# Patient Record
Sex: FEMALE | Race: ASIAN | NOT HISPANIC OR LATINO | Employment: UNEMPLOYED | URBAN - METROPOLITAN AREA
[De-identification: names, ages, dates, MRNs, and addresses within clinical notes are randomized per-mention and may not be internally consistent; named-entity substitution may affect disease eponyms.]

---

## 2024-01-22 ENCOUNTER — HOSPITAL ENCOUNTER (EMERGENCY)
Facility: OTHER | Age: 29
Discharge: HOME | End: 2024-01-22
Attending: EMERGENCY MEDICINE
Payer: COMMERCIAL

## 2024-01-22 VITALS
TEMPERATURE: 98 F | DIASTOLIC BLOOD PRESSURE: 61 MMHG | BODY MASS INDEX: 25.76 KG/M2 | HEIGHT: 62 IN | HEART RATE: 77 BPM | SYSTOLIC BLOOD PRESSURE: 106 MMHG | WEIGHT: 140 LBS | RESPIRATION RATE: 18 BRPM | OXYGEN SATURATION: 98 %
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
## 2024-01-22 LAB
B-HCG UR QL: NEGATIVE
CTP QC/QA: YES

## 2024-01-22 PROCEDURE — 25000003 PHARM REV CODE 250: Performed by: NURSE PRACTITIONER

## 2024-01-22 PROCEDURE — 99284 EMERGENCY DEPT VISIT MOD MDM: CPT

## 2024-01-22 PROCEDURE — 90715 TDAP VACCINE 7 YRS/> IM: CPT | Performed by: NURSE PRACTITIONER

## 2024-01-22 PROCEDURE — 63600175 PHARM REV CODE 636 W HCPCS: Performed by: NURSE PRACTITIONER

## 2024-01-22 PROCEDURE — 90471 IMMUNIZATION ADMIN: CPT | Performed by: NURSE PRACTITIONER

## 2024-01-22 PROCEDURE — 81025 URINE PREGNANCY TEST: CPT | Performed by: NURSE PRACTITIONER

## 2024-01-22 RX ORDER — AMOXICILLIN AND CLAVULANATE POTASSIUM 875; 125 MG/1; MG/1
1 TABLET, FILM COATED ORAL 2 TIMES DAILY
Qty: 14 TABLET | Refills: 0 | Status: SHIPPED | OUTPATIENT
Start: 2024-01-22

## 2024-01-22 RX ORDER — AMOXICILLIN AND CLAVULANATE POTASSIUM 875; 125 MG/1; MG/1
1 TABLET, FILM COATED ORAL
Status: COMPLETED | OUTPATIENT
Start: 2024-01-22 | End: 2024-01-22

## 2024-01-22 RX ADMIN — TETANUS TOXOID, REDUCED DIPHTHERIA TOXOID AND ACELLULAR PERTUSSIS VACCINE, ADSORBED 0.5 ML: 5; 2.5; 8; 8; 2.5 SUSPENSION INTRAMUSCULAR at 11:01

## 2024-01-22 RX ADMIN — BACITRACIN ZINC, NEOMYCIN, POLYMYXIN B 1 EACH: 400; 3.5; 5 OINTMENT TOPICAL at 11:01

## 2024-01-22 RX ADMIN — AMOXICILLIN AND CLAVULANATE POTASSIUM 1 TABLET: 875; 125 TABLET, FILM COATED ORAL at 11:01

## 2024-01-22 NOTE — ED PROVIDER NOTES
"Source of History:  Patient     Chief complaint:  Cat Scratch (Pt was scratched by a street cat on anterior aspect of R lower leg. Scratch approx 5 cm, no bleeding noted. Ambulatory at triage. She approached the cat, no other odd behavior noted about cat. Pt was not bitten by cat as far as she knows. Last tdap unknown.)      HPI:  Pancho Cheney is a 28 y.o. female presenting with complaint of cat scratches to her right lower leg that occurred prior to arrival.  Patient reports that this is a stray cat in her neighborhood that she interacts with often.  Reports today she was interacting with the cat and another cat approached and caused the other CT to scratching on the right leg.  Patient does not believe that she was bitten.  Patient's tetanus is not up-to-date    This is the extent to the patients complaints today here in the emergency department.    PMH:  As per HPI and below:  No past medical history on file.  No past surgical history on file.         Review of patient's allergies indicates:  No Known Allergies    ROS: As per HPI and below:  Constitutional: No fever.  No chills.  Eyes: No visual changes.   ENT: No sore throat. No ear pain.  Urinary: No abnormal urination.  MSK: No back pain. No joint pain.   Integument:  Cat scratches    Physical Exam:    /61 (BP Location: Right arm, Patient Position: Sitting)   Pulse 77   Temp 98 °F (36.7 °C) (Oral)   Resp 18   Ht 5' 2" (1.575 m)   Wt 63.5 kg (140 lb)   SpO2 98%   BMI 25.61 kg/m²   Vitals:    01/22/24 1017 01/22/24 1137   BP: (!) 100/55 106/61   Pulse: 80 77   Resp: 18 18   Temp: 97.9 °F (36.6 °C) 98 °F (36.7 °C)   TempSrc: Oral Oral   SpO2: 96% 98%   Weight: 63.5 kg (140 lb)    Height: 5' 2" (1.575 m)        Nursing note and vital signs reviewed.  Constitutional: No acute distress.  Eyes: No conjunctival injection.  Extraocular muscles are intact.  ENT: Normal phonation.  Musculoskeletal: Good range of motion all joints.  No deformities.  Neck " supple.  No meningismus. Neurovascularly intact.  Skin:  Multiple scratches are noted to the right lower leg no bleeding noted.  Psych: Appropriate, conversant.      Labs Reviewed   POCT URINE PREGNANCY       No orders to display         MDM/ Differential Dx:  Abrasion, cellulitis    Medical Decision Making  28-year-old female attacked by a stray cat today he reports his CT is no neighborhood and she interacts with a often.  Patient has sustained multiple scratches to the right lower leg.  Her tetanus was updated ED. patient does not believe that she was bitten however will discharge patient home with amoxicillin prophylactically.  Discussed good wound care for the scratches    Risk  Prescription drug management.             Diagnostic Impression:    1. Cat scratch         ED Disposition Condition    Discharge Stable            ED Prescriptions       Medication Sig Dispense Start Date End Date Auth. Provider    amoxicillin-clavulanate 875-125mg (AUGMENTIN) 875-125 mg per tablet Take 1 tablet by mouth 2 (two) times daily. 14 tablet 1/22/2024 -- Crissy Desai, BRENNON          Follow-up Information       Follow up With Specialties Details Why Contact Info    [facility] - Emergency Dept Emergency Medicine Go to  If symptoms worsen [address]  [facility] [id number] [phone]             Crissy Desai, BRENNON  01/22/24 0585

## 2024-01-22 NOTE — ED TRIAGE NOTES
Scratched by stray cat on right knee this morning. States cleaned with running water and alcohol PTA. Presents in no distress.

## 2024-01-22 NOTE — FIRST PROVIDER EVALUATION
Emergency Department TeleTriage Encounter Note      CHIEF COMPLAINT    Chief Complaint   Patient presents with    Cat Scratch     Pt was scratched by a street cat on anterior aspect of R lower leg. Scratch approx 5 cm, no bleeding noted. Ambulatory at triage. She approached the cat, no other odd behavior noted about cat. Pt was not bitten by cat as far as she knows. Last tdap unknown.       VITAL SIGNS   Initial Vitals [01/22/24 1017]   BP Pulse Resp Temp SpO2   (!) 100/55 80 18 97.9 °F (36.6 °C) 96 %      MAP       --            ALLERGIES    Review of patient's allergies indicates:  No Known Allergies    PROVIDER TRIAGE NOTE  Verbal consent for the teletriage evaluation was given by the patient at the start of the evaluation.  All efforts will be made to maintain patient's privacy during the evaluation.      This is a teletriage evaluation of a 28 y.o. female presenting to the ED with c/o scratched by a stray cat to both knees today. Unsure of last tetanus.  Limited physical exam via telehealth: The patient is awake, alert, answering questions appropriately and is not in respiratory distress.  As the Teletriage provider, I performed an initial assessment and ordered appropriate labs and imaging studies, if any, to facilitate the patient's care once placed in the ED. Once a room is available, care and a full evaluation will be completed by an alternate ED provider.  Any additional orders and the final disposition will be determined by that provider.  All imaging and labs will not be followed-up by the Teletriage Team, including myself.          ORDERS  Labs Reviewed - No data to display    ED Orders (720h ago, onward)      Start Ordered     Status Ordering Provider    01/23/24 0600 01/22/24 1029  Wound care routine - Clean wound  Daily        Comments: Clean Wound    Ordered PAULY HILLS    01/22/24 1129 01/22/24 1029  Tdap (BOOSTRIX) vaccine injection 0.5 mL  vaccine x 1 dose         Ordered PAULY HILLS     01/22/24 1030 01/22/24 1029  POCT urine pregnancy  Once         Ordered PAULY HILLS    01/22/24 1030 01/22/24 1029  Change dressing - apply dry sterile dressing  Once        Comments: Apply dry sterile dressing.    Ordered PAULY HILLS    01/22/24 1030 01/22/24 1029  neomycin-bacitracnZn-polymyxnB packet  ED 1 Time         Ordered PAULY HILLS              Virtual Visit Note: The provider triage portion of this emergency department evaluation and documentation was performed via [employer], a HIPAA-compliant telemedicine application, in concert with a tele-presenter in the room. A face to face patient evaluation with one of my colleagues will occur once the patient is placed in an emergency department room.      DISCLAIMER: This note was prepared with [employer] voice recognition transcription software. Garbled syntax, mangled pronouns, and other bizarre constructions may be attributed to that software system.